# Patient Record
Sex: FEMALE | Race: WHITE | NOT HISPANIC OR LATINO
[De-identification: names, ages, dates, MRNs, and addresses within clinical notes are randomized per-mention and may not be internally consistent; named-entity substitution may affect disease eponyms.]

---

## 2023-04-12 ENCOUNTER — APPOINTMENT (OUTPATIENT)
Dept: ULTRASOUND IMAGING | Facility: CLINIC | Age: 28
End: 2023-04-12

## 2023-11-08 ENCOUNTER — APPOINTMENT (OUTPATIENT)
Dept: OBGYN | Facility: CLINIC | Age: 28
End: 2023-11-08

## 2024-02-15 PROBLEM — Z00.00 ENCOUNTER FOR PREVENTIVE HEALTH EXAMINATION: Status: ACTIVE | Noted: 2024-02-15

## 2024-03-18 ENCOUNTER — APPOINTMENT (OUTPATIENT)
Dept: OBGYN | Facility: CLINIC | Age: 29
End: 2024-03-18
Payer: COMMERCIAL

## 2024-03-18 ENCOUNTER — NON-APPOINTMENT (OUTPATIENT)
Age: 29
End: 2024-03-18

## 2024-03-18 VITALS
DIASTOLIC BLOOD PRESSURE: 73 MMHG | HEIGHT: 64 IN | BODY MASS INDEX: 29.02 KG/M2 | SYSTOLIC BLOOD PRESSURE: 113 MMHG | HEART RATE: 76 BPM | OXYGEN SATURATION: 96 % | WEIGHT: 170 LBS

## 2024-03-18 DIAGNOSIS — Z01.419 ENCOUNTER FOR GYNECOLOGICAL EXAMINATION (GENERAL) (ROUTINE) W/OUT ABNORMAL FINDINGS: ICD-10-CM

## 2024-03-18 DIAGNOSIS — Z11.3 ENCOUNTER FOR SCREENING FOR INFECTIONS WITH A PREDOMINANTLY SEXUAL MODE OF TRANSMISSION: ICD-10-CM

## 2024-03-18 DIAGNOSIS — Z11.51 ENCOUNTER FOR SCREENING FOR HUMAN PAPILLOMAVIRUS (HPV): ICD-10-CM

## 2024-03-18 PROCEDURE — 36415 COLL VENOUS BLD VENIPUNCTURE: CPT

## 2024-03-18 PROCEDURE — 99385 PREV VISIT NEW AGE 18-39: CPT

## 2024-03-18 NOTE — HISTORY OF PRESENT ILLNESS
[HIV test declined] : HIV test declined [Gonorrhea test declined] : Gonorrhea test declined [Syphilis test declined] : Syphilis test declined [Trichomonas test declined] : Trichomonas test declined [Chlamydia test declined] : Chlamydia test declined [HPV test declined] : HPV test declined [Hepatitis B test declined] : Hepatitis B test declined [Hepatitis C test declined] : Hepatitis C test declined [Patient reported PAP Smear was normal] : Patient reported PAP Smear was normal [N] : Patient denies prior pregnancies [No] : Patient does not have concerns regarding sex [FreeTextEntry1] : 28-year-old with a history of regular menses LMP 2/22/2024.  Patient has history of 28-day cycles lasting 5 days with menarche at age 13.  She was diagnosed with PCOS approximately 6 months ago this was done by sonogram and some blood work.  She is currently not on a birth control pill however she has been on it several years ago she said this was actually beneficial in terms of her acne and and prevention of hirsutism.  She currently does admit to both of those problems with the acne being worse at this time.  She states that when she was on the birth control pills she had less bleeding and better control of her acne and hirsutism.  No GI or  issues at this time [TextBox_4] : 29 YO PATIENT PRESENTS TO OFFICE FOR AN ANNUAL VISIT. LMP: 2/232/24 [BreastSonogramDate] : NO A S PER PT [Mammogramdate] : NO AS PER PT [PapSmeardate] : 2021  [BoneDensityDate] : NO AS PER PT [ColonoscopyDate] : NO A SPER PT [LMPDate] : 02/22/24 [MensesFreq] : 28 [MensesLength] : 6

## 2024-03-18 NOTE — PLAN
[FreeTextEntry1] : Pap smear completed patient will have PCOS panel on day 3 of her cycle plus transvaginal sonogram we discussed birth control benefits risk pros and cons.  Patient will return for consult with transvaginal sonogram in 1 to 2 weeks after having blood work done.

## 2024-03-18 NOTE — PHYSICAL EXAM
[Appropriately responsive] : appropriately responsive [Alert] : alert [No Acute Distress] : no acute distress [No Lymphadenopathy] : no lymphadenopathy [Regular Rate Rhythm] : regular rate rhythm [No Murmurs] : no murmurs [Clear to Auscultation B/L] : clear to auscultation bilaterally [Soft] : soft [Non-tender] : non-tender [Non-distended] : non-distended [No HSM] : No HSM [No Lesions] : no lesions [No Mass] : no mass [Oriented x3] : oriented x3 [FreeTextEntry6] : Symmetrical [Examination Of The Breasts] : a normal appearance [Breast Palpation Diffuse Fibrous Tissue Bilateral] : fibrocystic changes [No Masses] : no breast masses were palpable [Labia Majora] : normal [Labia Minora] : normal [No Bleeding] : There was no active vaginal bleeding [Normal] : normal [Anteversion] : anteverted [Uterine Adnexae] : normal [Declined] : Patient declined rectal exam [FreeTextEntry8] : No masses nontender

## 2024-03-19 LAB
C TRACH RRNA SPEC QL NAA+PROBE: NOT DETECTED
HPV HIGH+LOW RISK DNA PNL CVX: NOT DETECTED
N GONORRHOEA RRNA SPEC QL NAA+PROBE: NOT DETECTED
SOURCE TP AMPLIFICATION: NORMAL

## 2024-03-22 LAB — CYTOLOGY CVX/VAG DOC THIN PREP: NORMAL

## 2024-05-09 LAB — PROLACTIN SERPL-MCNC: 26.2 NG/ML

## 2024-05-15 ENCOUNTER — APPOINTMENT (OUTPATIENT)
Dept: OBGYN | Facility: CLINIC | Age: 29
End: 2024-05-15
Payer: COMMERCIAL

## 2024-05-15 DIAGNOSIS — E28.2 POLYCYSTIC OVARIAN SYNDROME: ICD-10-CM

## 2024-05-15 PROCEDURE — 99213 OFFICE O/P EST LOW 20 MIN: CPT

## 2024-05-15 NOTE — REASON FOR VISIT
[Home] : at home, [unfilled] , at the time of the visit. [Medical Office: ([facility])___] : at the medical office located in  [Verbal consent obtained from patient] : the patient, [unfilled] [FreeTextEntry4] : Deborah [Consultation] : consultation for [FreeTextEntry2] : Follow-up lab work and PCOS

## 2024-05-15 NOTE — HISTORY OF PRESENT ILLNESS
[FreeTextEntry1] : 28-year-old with history of acne and hirsutism and elevated prolactin for telehealth visit to discuss signs and symptoms of PCOS and review laboratory workup which included repeat serum prolactin level which has decreased from 36 on 3/26/2020 24-26 on 5/8/2024.  Patient states she had previously been on oral contraceptives at a low dose and this resulted in some mood changes and an overall negative feeling.  She currently is having regular menses every 28 days lasting 3 to 5 days and feels much better in terms of her physical and psychological wellbeing.  Upon review of her laboratories we noted an elevated AMH to 7 there was a reversed ratio LH to FSH on day 3 of her menstrual cycle TSH within normal limits.  In light of her elevated prolactin AMH and history of PCOS we recommended nutritional counseling which she has undergone vitamin therapy weightbearing exercises and keeping a menstrual diary to review her cycles and irregularities if they do exist.  Patient is agreeable to plan we will touch base again in 6 months we will repeat her prolactin and AMH levels.  We did discuss using a birth control low-dose androgenic potential for improved clinical signs of PCOS.  We will return for telehealth in 6 months

## 2024-08-27 ENCOUNTER — APPOINTMENT (OUTPATIENT)
Dept: OBGYN | Facility: CLINIC | Age: 29
End: 2024-08-27
Payer: COMMERCIAL

## 2024-08-27 DIAGNOSIS — L70.9 ACNE, UNSPECIFIED: ICD-10-CM

## 2024-08-27 DIAGNOSIS — Z78.9 OTHER SPECIFIED HEALTH STATUS: ICD-10-CM

## 2024-08-27 DIAGNOSIS — E28.2 POLYCYSTIC OVARIAN SYNDROME: ICD-10-CM

## 2024-08-27 DIAGNOSIS — Z80.0 FAMILY HISTORY OF MALIGNANT NEOPLASM OF DIGESTIVE ORGANS: ICD-10-CM

## 2024-08-27 DIAGNOSIS — L68.0 HIRSUTISM: ICD-10-CM

## 2024-08-27 PROCEDURE — 99213 OFFICE O/P EST LOW 20 MIN: CPT

## 2024-08-27 NOTE — PHYSICAL EXAM
[Chaperone Present] : A chaperone was present in the examining room during all aspects of the physical examination [FreeTextEntry2] : Christelle DÍAZ

## 2024-08-27 NOTE — HISTORY OF PRESENT ILLNESS
[Y] : Patient is sexually active [N] : Patient denies prior pregnancies [Currently Active] : currently active [Men] : men [Vaginal] : vaginal [No] : No [TextBox_4] : Discuss options for PCO'S [PapSmeardate] : 3/18/24 [TextBox_31] : negative [HIVDate] : decline [SyphilisDate] : decline [GonorrheaDate] : 3/18/24 [TextBox_63] : neg [ChlamydiaDate] : 3/18/24 [TextBox_68] : neg [TrichomonasDate] : 3/18/24 [TextBox_73] : neg [HPVDate] : 3/18/24 [TextBox_78] : neg [HepatitisBDate] : decline [HepatitisCDate] : decline [LMPDate] : 8/21/24 [PGHxTotal] : 0 [PGHxFullTerm] : 0 [[facility] [TextBox_6] : 8/21/24 [FreeTextEntry1] : 8/21/24

## 2024-09-06 LAB
ANTI-MUELLERIAN HORMONE: 6.06 NG/ML
ESTIMATED AVERAGE GLUCOSE: 103 MG/DL
FSH SERPL-MCNC: 3.9 IU/L
GLUCOSE BS SERPL-MCNC: 90 MG/DL
HBA1C MFR BLD HPLC: 5.2 %
HCG-TM SERPL-MCNC: <1 MIU/ML
INSULIN P FAST SERPL-ACNC: 10 UU/ML
LH SERPL-ACNC: 9.7 IU/L
PROGEST SERPL-MCNC: 0.9 NG/ML
PROLACTIN SERPL-MCNC: 30.4 NG/ML
TESTOST FREE SERPL-MCNC: 1.2 PG/ML
TESTOST SERPL-MCNC: 37.9 NG/DL
TSH SERPL-ACNC: 2.26 UIU/ML

## 2024-09-11 ENCOUNTER — ASOB RESULT (OUTPATIENT)
Age: 29
End: 2024-09-11

## 2024-09-11 ENCOUNTER — APPOINTMENT (OUTPATIENT)
Dept: OBGYN | Facility: CLINIC | Age: 29
End: 2024-09-11
Payer: COMMERCIAL

## 2024-09-11 DIAGNOSIS — E28.2 POLYCYSTIC OVARIAN SYNDROME: ICD-10-CM

## 2024-09-11 PROCEDURE — 76857 US EXAM PELVIC LIMITED: CPT | Mod: 59

## 2024-09-11 PROCEDURE — 76830 TRANSVAGINAL US NON-OB: CPT

## 2024-09-11 PROCEDURE — 99213 OFFICE O/P EST LOW 20 MIN: CPT

## 2024-10-07 ENCOUNTER — APPOINTMENT (OUTPATIENT)
Dept: OBGYN | Facility: CLINIC | Age: 29
End: 2024-10-07
Payer: COMMERCIAL

## 2024-10-07 DIAGNOSIS — L68.0 HIRSUTISM: ICD-10-CM

## 2024-10-07 DIAGNOSIS — L70.9 ACNE, UNSPECIFIED: ICD-10-CM

## 2024-10-07 DIAGNOSIS — E28.2 POLYCYSTIC OVARIAN SYNDROME: ICD-10-CM

## 2024-10-07 PROCEDURE — 99213 OFFICE O/P EST LOW 20 MIN: CPT
